# Patient Record
Sex: FEMALE | Race: WHITE | NOT HISPANIC OR LATINO | Employment: FULL TIME | URBAN - METROPOLITAN AREA
[De-identification: names, ages, dates, MRNs, and addresses within clinical notes are randomized per-mention and may not be internally consistent; named-entity substitution may affect disease eponyms.]

---

## 2024-10-17 ENCOUNTER — HOSPITAL ENCOUNTER (OUTPATIENT)
Dept: SLEEP MEDICINE | Facility: HOSPITAL | Age: 39
End: 2024-10-17
Payer: COMMERCIAL

## 2024-10-17 VITALS — WEIGHT: 184.08 LBS | BODY MASS INDEX: 29.58 KG/M2 | HEIGHT: 66 IN

## 2024-10-17 DIAGNOSIS — R29.818 SUSPECTED SLEEP APNEA: ICD-10-CM

## 2024-10-17 DIAGNOSIS — R06.83 SNORING: ICD-10-CM

## 2024-10-17 DIAGNOSIS — G47.19 EXCESSIVE DAYTIME SLEEPINESS: ICD-10-CM

## 2024-10-17 PROCEDURE — 95800 SLP STDY UNATTENDED: CPT

## 2024-10-18 DIAGNOSIS — G47.33 OSA (OBSTRUCTIVE SLEEP APNEA): Primary | ICD-10-CM

## 2024-10-18 DIAGNOSIS — R06.83 SNORING: ICD-10-CM

## 2024-10-28 ENCOUNTER — TELEMEDICINE (OUTPATIENT)
Dept: SLEEP MEDICINE | Facility: CLINIC | Age: 39
End: 2024-10-28
Payer: COMMERCIAL

## 2024-10-28 VITALS — WEIGHT: 183 LBS | BODY MASS INDEX: 29.41 KG/M2 | HEIGHT: 66 IN

## 2024-10-28 DIAGNOSIS — G47.33 OSA (OBSTRUCTIVE SLEEP APNEA): Primary | ICD-10-CM

## 2024-10-28 PROCEDURE — 99213 OFFICE O/P EST LOW 20 MIN: CPT | Performed by: NURSE PRACTITIONER

## 2024-10-28 NOTE — PROGRESS NOTES
Chief Complaint:   Chief Complaint   Patient presents with    Follow-up       HPI:    Ronny Wen is a 39 y.o. female here for follow-up of sleep study.    Ronny Downing is a 39 y.o. female who was here to establish care 8/27/2024..  Patient does see Khadijah ANGLIN for care as well as Dr. Latoya Benavides.  Patient has a history of Hashimoto's thyroiditis, and suspected sleep apnea.  She does have a history of controlled anxiety.  Patient has a 1 to 2-year history of snoring, excessive daytime sleepiness, nonrestorative sleep, and frequent nighttime urination.  Patient states she has been  for 8 years but now has a partner who is complaining of the symptoms.  Patient has never had a nasal fracture, head injury, or hypnagogic hallucinations she has had a very rare episode of sleep paralysis.     Patient goes to bed during the week at 10 PM getting up at 6:30 AM.  On the weekend going to bed at 11 PM getting up at 8 AM.  She estimates getting 8 hours of sleep nightly.  He will usually take her 30 minutes to go to sleep and is not rested upon awakening.  Patient puts  [place] score at 7/24.  Patient does toss and turn all night and gets up x 1 for the bathroom.  Patient does like to take a daily nap anywhere from 30 minutes to 2 hours.  Patient is then still not rested.    Patient had sleep study 10/17/2024 that showed mild obstructive sleep apnea with an AHI of 7.0.  We did discuss this today as well as different options available to her for treatment and she does wish to initiate CPAP therapy.    Current medications are:   Current Outpatient Medications:     escitalopram (LEXAPRO) 10 MG tablet, Take 1 tablet by mouth Daily., Disp: , Rfl:     levothyroxine (Synthroid) 100 MCG tablet, Take 1 tablet by mouth Daily., Disp: 90 tablet, Rfl: 1.      The patient's relevant past medical, surgical, family and social history were reviewed and updated in Epic as appropriate.       Review of Systems   Constitutional:   Positive for fatigue.   HENT:  Positive for congestion, rhinorrhea, sinus pressure and sinus pain.    Eyes:  Positive for visual disturbance.   Respiratory:  Positive for apnea.    Allergic/Immunologic: Positive for environmental allergies.   Psychiatric/Behavioral:  Positive for sleep disturbance. The patient is nervous/anxious.    All other systems reviewed and are negative.        Objective:    Physical Exam  Constitutional:       Appearance: Normal appearance.   HENT:      Head: Normocephalic and atraumatic.   Pulmonary:      Effort: Pulmonary effort is normal. No respiratory distress.   Neurological:      Mental Status: She is alert and oriented to person, place, and time.   Psychiatric:         Mood and Affect: Mood normal.         Behavior: Behavior normal.         Thought Content: Thought content normal.         Judgment: Judgment normal.             ASSESSMENT/PLAN    Diagnoses and all orders for this visit:    1. CIARA (obstructive sleep apnea) (Primary)        Counseled patient regarding multimodal approach with healthy nutrition, healthy sleep, regular physical activity, social activities, counseling, and medications. Encouraged to practice lateral sleep position. Avoid alcohol and sedatives close to bedtime.    Order sent to [facility] in [place] to initiate CPAP therapy I will see her back in 31 to 90 days.  The patient is located in [facility] at work. The patient presents today for telehealth service.  This service was conducted via audio/video technology through a secure [employer] video visit connection through Epic.  This provider is located in [facility].  Patient stated they are in a secure environment for the session.  Patient's condition being diagnosed/treated is appropriate for telemedicine.  The provider identified himself as well as his credentials.  The patient, and/or patient's guardian, consent to be seen remotely, and when consent is given they understanding that the  consent allows for patient identifiable information to be sent to a third-party as needed.  They may refuse to be seen remotely at any time.  The electronic data is encrypted and password protected, and the patient and/or guardian has been advised of the potential risk to privacy not withstanding such measures.  Patient identifiers used: Name and date of birth.   I have reviewed the results of my evaluation and impression and discussed my recommendations in detail with the patient.      Signed by  DERREK Stoner    October 28, 2024      CC: Khadijah Aparicio APRN         No ref. provider found

## 2025-02-06 DIAGNOSIS — E06.3 HYPOTHYROIDISM DUE TO HASHIMOTO'S THYROIDITIS: ICD-10-CM

## 2025-02-06 RX ORDER — LEVOTHYROXINE SODIUM 100 UG/1
100 TABLET ORAL DAILY
Qty: 90 TABLET | Refills: 1 | Status: SHIPPED | OUTPATIENT
Start: 2025-02-06 | End: 2026-02-06

## 2025-05-28 ENCOUNTER — OFFICE VISIT (OUTPATIENT)
Dept: ENDOCRINOLOGY | Facility: CLINIC | Age: 40
End: 2025-05-28
Payer: OTHER GOVERNMENT

## 2025-05-28 VITALS
DIASTOLIC BLOOD PRESSURE: 76 MMHG | BODY MASS INDEX: 30.15 KG/M2 | SYSTOLIC BLOOD PRESSURE: 122 MMHG | HEIGHT: 66 IN | WEIGHT: 187.6 LBS | HEART RATE: 85 BPM | OXYGEN SATURATION: 97 %

## 2025-05-28 DIAGNOSIS — E06.3 HYPOTHYROIDISM DUE TO HASHIMOTO'S THYROIDITIS: Primary | ICD-10-CM

## 2025-05-28 DIAGNOSIS — G47.33 OSA (OBSTRUCTIVE SLEEP APNEA): ICD-10-CM

## 2025-05-28 LAB
T4 FREE SERPL-MCNC: 1.32 NG/DL (ref 0.92–1.68)
TSH SERPL DL<=0.05 MIU/L-ACNC: 0.75 UIU/ML (ref 0.27–4.2)

## 2025-05-28 PROCEDURE — 84439 ASSAY OF FREE THYROXINE: CPT | Performed by: INTERNAL MEDICINE

## 2025-05-28 PROCEDURE — 84443 ASSAY THYROID STIM HORMONE: CPT | Performed by: INTERNAL MEDICINE

## 2025-05-28 PROCEDURE — 99213 OFFICE O/P EST LOW 20 MIN: CPT | Performed by: INTERNAL MEDICINE

## 2025-05-28 PROCEDURE — 36415 COLL VENOUS BLD VENIPUNCTURE: CPT | Performed by: INTERNAL MEDICINE

## 2025-05-28 RX ORDER — DOXYCYCLINE 100 MG/1
100 CAPSULE ORAL DAILY
COMMUNITY
Start: 2025-05-20

## 2025-05-28 RX ORDER — MELOXICAM 15 MG/1
15 TABLET ORAL DAILY PRN
COMMUNITY
Start: 2025-03-10

## 2025-05-28 NOTE — ASSESSMENT & PLAN NOTE
Subclinical hypothyroidism diagnosed June 2023.  TSH became suppressed for short time and dose was reduced, but up to 100 mcg as of 4/2024 and recent TFTs normal.    With persistent fatigue but was diagnosed with CIARA and wasn't able to use CPAP due to settings.   Recheck TFTs today and titrate dose if needed. Consider addition of T3 if needed.

## 2025-05-28 NOTE — PROGRESS NOTES
"Chief Complaint   Patient presents with    Hypothyroidism    Hashimoto's Thyroiditis        HPI   Ronny Wen is a 40 y.o. female had concerns including Hypothyroidism and Hashimoto's Thyroiditis.      For follow-up on hypothyroidism.  Last dose increase in levothyroxine was about a year ago.  She is on levothyroxine 100 mcg daily.  Recent TFTs from February were stable/optimal range.  TSH 1.85.    She has been diagnosed with sleep apnea since her last visit here.  Was advised to start PAP therapy but didn't tolerate. Felt like the settings weren't correct. Was advised this couldn't be adjusted without the provider changing.         The following portions of the patient's history were reviewed and updated as appropriate: allergies, current medications, past family history, past medical history, past social history, past surgical history, and problem list.    Review of Systems   Constitutional:  Positive for fatigue.   Hematological:  Positive for adenopathy.        /76 (BP Location: Right arm, Patient Position: Sitting)   Pulse 85   Ht 167.6 cm (65.98\")   Wt 85.1 kg (187 lb 9.6 oz)   SpO2 97%   BMI 30.29 kg/m²      Physical Exam  Vitals reviewed.   Constitutional:       Appearance: Normal appearance.   Cardiovascular:      Rate and Rhythm: Normal rate.   Pulmonary:      Effort: Pulmonary effort is normal.   Neurological:      General: No focal deficit present.      Mental Status: She is alert. Mental status is at baseline.   Psychiatric:         Mood and Affect: Mood normal.         Behavior: Behavior normal.              LABS AND IMAGING       TSH  Lab Results   Component Value Date    TSH 0.075 (L) 12/27/2023       T4  Lab Results   Component Value Date    FREET4 1.44 12/27/2023 2/9/2024 TSH 6.06, free T4 0.99 On levothyroxine 75 mcg daily.  Increase to 88 mcg.    4/26/24 TSH 3.53, free T4 1.23, increased to 100 mcg  6/25/24 TSH 2.29, free T4 1.6 - stay on 100 mcg  2/4/2025 Free T4 1.34, TSH 1.85, " continue 100 mcg    Assessment and Plan    Diagnoses and all orders for this visit:    1. Hypothyroidism due to Hashimoto's thyroiditis (Primary)  Assessment & Plan:  Subclinical hypothyroidism diagnosed June 2023.  TSH became suppressed for short time and dose was reduced, but up to 100 mcg as of 4/2024 and recent TFTs normal.    With persistent fatigue but was diagnosed with CIARA and wasn't able to use CPAP due to settings.   Recheck TFTs today and titrate dose if needed. Consider addition of T3 if needed.    Orders:  -     T4, Free  -     TSH    2. CIARA (obstructive sleep apnea)  Assessment & Plan:  Diagnosed recently but didn't tolerate PAP. I encouraged her to follow-up to adjust settings.            Return in about 6 months (around 11/28/2025) for next scheduled follow up. The patient was instructed to contact the clinic with any interval questions or concerns.    Electronically signed by: Latoya Victor DO   Endocrinologist    Please note that portions of this note were completed with a voice recognition program.

## 2025-06-05 ENCOUNTER — OFFICE VISIT (OUTPATIENT)
Dept: SLEEP MEDICINE | Facility: CLINIC | Age: 40
End: 2025-06-05
Payer: OTHER GOVERNMENT

## 2025-06-05 VITALS
HEART RATE: 79 BPM | HEIGHT: 66 IN | TEMPERATURE: 97.8 F | SYSTOLIC BLOOD PRESSURE: 100 MMHG | WEIGHT: 182 LBS | OXYGEN SATURATION: 99 % | DIASTOLIC BLOOD PRESSURE: 74 MMHG | BODY MASS INDEX: 29.25 KG/M2

## 2025-06-05 DIAGNOSIS — G47.33 OSA (OBSTRUCTIVE SLEEP APNEA): Primary | ICD-10-CM

## 2025-06-05 PROCEDURE — 99213 OFFICE O/P EST LOW 20 MIN: CPT | Performed by: NURSE PRACTITIONER

## 2025-06-05 NOTE — PROGRESS NOTES
Chief Complaint:   Chief Complaint   Patient presents with    Follow-up    Sleeping Problem       HPI:    Ronny Wen is a 40 y.o. female here for follow-up of sleep apnea.  Patient was last seen 10/28/2024 and did wish to initiate CPAP therapy for an AHI of 7.0.  Patient states she has not been good about using CPAP.  She does continue to get 8 hours of sleep nightly.  And go to sleep within 20 to 30 minutes.  She does use the restroom 1-2 times in the night and has an [place] score of 7/24.  Patient has used her machine to the last 90 days.  She does have difficulty with pressure increasing during the night and causes her to be uncomfortable.  We will do an order today to decrease pressure 8-10 and increase ramp feature to 30 minutes if this is not comfortable and she cannot tolerate she would be a very good candidate for MAD.  Will follow-up in 6 months to reassess.        Current medications are:   Current Outpatient Medications:     doxycycline (VIBRAMYCIN) 100 MG capsule, Take 1 capsule by mouth Daily., Disp: , Rfl:     escitalopram (LEXAPRO) 10 MG tablet, Take 1 tablet by mouth Daily., Disp: , Rfl:     levothyroxine (Synthroid) 88 MCG tablet, Take 1 tablet by mouth Daily., Disp: 30 tablet, Rfl: 5    liothyronine (CYTOMEL) 5 MCG tablet, Take 1 tablet by mouth Daily., Disp: 30 tablet, Rfl: 5    meloxicam (MOBIC) 15 MG tablet, Take 1 tablet by mouth Daily As Needed (plantar facitis)., Disp: , Rfl:     metroNIDAZOLE-Cleanser 0.75 % CREAM kit, APPLY A THIN LAYER TO THE AFFECTED AREA(S) BY TOPICAL ROUTE ONCE PER DAY AT BEDTIME., Disp: , Rfl: .      The patient's relevant past medical, surgical, family and social history were reviewed and updated in Epic as appropriate.       Review of Systems   HENT:  Positive for congestion, rhinorrhea, sinus pressure and sinus pain.    Eyes:  Positive for visual disturbance.   Respiratory:  Positive for apnea.    Allergic/Immunologic: Positive for environmental allergies.  "  Psychiatric/Behavioral:  Positive for sleep disturbance. The patient is nervous/anxious.    All other systems reviewed and are negative.        Objective:    Physical Exam  Constitutional:       Appearance: Normal appearance.   HENT:      Head: Normocephalic and atraumatic.   Cardiovascular:      Rate and Rhythm: Normal rate and regular rhythm.   Pulmonary:      Effort: Pulmonary effort is normal.      Breath sounds: Normal breath sounds.   Skin:     General: Skin is warm and dry.   Neurological:      Mental Status: She is alert and oriented to person, place, and time.   Psychiatric:         Mood and Affect: Mood normal.         Behavior: Behavior normal.         Thought Content: Thought content normal.         Judgment: Judgment normal.     /74   Pulse 79   Temp 97.8 °F (36.6 °C)   Ht 167.6 cm (65.98\")   Wt 82.6 kg (182 lb)   SpO2 99%   BMI 29.39 kg/m²       CPAP Report  2/90 days of use  Greater than 4-hour use 0  Setting 818  95th percentile pressure 9.1  AHI 1.8    The patient continues to use and benefit from CPAP therapy.    ASSESSMENT/PLAN    Diagnoses and all orders for this visit:    1. CIARA (obstructive sleep apnea) (Primary)  -     PAP Therapy        Counseled patient regarding multimodal approach with healthy nutrition, healthy sleep, regular physical activity, social activities, counseling, and medications. Encouraged to practice lateral sleep position. Avoid alcohol and sedatives close to bedtime.    Order for setting change 8-10 with 30-minute ramp time at 4 cm H2O I will see her back at 6 weeks to reassess and at that time we can discuss MAD if she is still uncomfortable.      Signed by  DERREK Stoner    June 5, 2025      CC: RecordKhadijah APRN         No ref. provider found      "

## 2025-06-18 ENCOUNTER — TELEPHONE (OUTPATIENT)
Dept: SLEEP MEDICINE | Facility: CLINIC | Age: 40
End: 2025-06-18

## 2025-06-18 NOTE — TELEPHONE ENCOUNTER
"Caller: Ronny Wen \"Mathew\"    Relationship to patient: Self    Best call back number: [phone]     Patient is needing: PT CALLING TO CHECK STATUS OF REQUEST FOR ORDERS TO BE PLACED REGARDING PUMP ADJUSTMENT. PLEASE CALL TO ADVISE.    "

## 2025-06-19 NOTE — TELEPHONE ENCOUNTER
"    Hub staff attempted to follow warm transfer process and was unsuccessful     Caller: Ronny Wen \"Mathew\"    Relationship to patient: Self    Best call back number: [phone]     Patient is needing: PT STATES THEY HAVENT RECEIVED. SHOULD BE SENT TO [facility] IN [place] EMAIL TO [email or web address]   FX AND EMAIL TO  AS WELL [phone]    "

## 2025-07-23 ENCOUNTER — OFFICE VISIT (OUTPATIENT)
Dept: SLEEP MEDICINE | Facility: CLINIC | Age: 40
End: 2025-07-23
Payer: OTHER GOVERNMENT

## 2025-07-23 VITALS
SYSTOLIC BLOOD PRESSURE: 112 MMHG | HEART RATE: 85 BPM | HEIGHT: 66 IN | BODY MASS INDEX: 28.28 KG/M2 | TEMPERATURE: 98 F | WEIGHT: 176 LBS | DIASTOLIC BLOOD PRESSURE: 72 MMHG | OXYGEN SATURATION: 98 %

## 2025-07-23 DIAGNOSIS — G47.33 OSA (OBSTRUCTIVE SLEEP APNEA): Primary | ICD-10-CM

## 2025-07-23 PROCEDURE — 99213 OFFICE O/P EST LOW 20 MIN: CPT | Performed by: NURSE PRACTITIONER

## 2025-07-23 NOTE — PROGRESS NOTES
Chief Complaint:   Chief Complaint   Patient presents with    Follow-up    Sleep Apnea       HPI:    Ronny Wen is a 40 y.o. female here for follow-up of sleep apnea.  Patient was last seen 6/5/2025 we did do an order at that time to decrease her pressure 8-10 and increase ramp to 30 minutes and apparently this has not been done by her DME.  This order has been faxed to them and documented x 3 and patient also went into the office x 2 and they told her both times they would take care of it.  It is still not been done.  She also feels she is having a large leak in her current mask and wishes to have a change in size of the mass.  She is called and left several messages without a return phone call.  Patient is eager to be compliant and eager to do well.  I will make a phone call to her DME today.        Current medications are:   Current Outpatient Medications:     doxycycline (VIBRAMYCIN) 100 MG capsule, Take 1 capsule by mouth Daily., Disp: , Rfl:     escitalopram (LEXAPRO) 10 MG tablet, Take 1 tablet by mouth Daily., Disp: , Rfl:     levothyroxine (Synthroid) 88 MCG tablet, Take 1 tablet by mouth Daily., Disp: 30 tablet, Rfl: 5    liothyronine (CYTOMEL) 5 MCG tablet, Take 1 tablet by mouth Daily., Disp: 30 tablet, Rfl: 5    meloxicam (MOBIC) 15 MG tablet, Take 1 tablet by mouth Daily As Needed (plantar facitis)., Disp: , Rfl:     metroNIDAZOLE-Cleanser 0.75 % CREAM kit, APPLY A THIN LAYER TO THE AFFECTED AREA(S) BY TOPICAL ROUTE ONCE PER DAY AT BEDTIME., Disp: , Rfl: .      The patient's relevant past medical, surgical, family and social history were reviewed and updated in Epic as appropriate.       Review of Systems   HENT:  Positive for congestion, rhinorrhea, sinus pressure and sinus pain.    Eyes:  Positive for visual disturbance.   Respiratory:  Positive for apnea.    Allergic/Immunologic: Positive for environmental allergies.   Psychiatric/Behavioral:  Positive for sleep disturbance. The patient is  "nervous/anxious.    All other systems reviewed and are negative.        Objective:    Physical Exam  Constitutional:       Appearance: Normal appearance.   HENT:      Head: Normocephalic and atraumatic.      Mouth/Throat:      Comments: Mallampati 1 anatomy  Pulmonary:      Effort: Pulmonary effort is normal. No respiratory distress.   Skin:     General: Skin is warm and dry.   Neurological:      Mental Status: She is alert and oriented to person, place, and time.   Psychiatric:         Mood and Affect: Mood normal.         Behavior: Behavior normal.         Thought Content: Thought content normal.         Judgment: Judgment normal.       /72   Pulse 85   Temp 98 °F (36.7 °C)   Ht 167.6 cm (65.98\")   Wt 79.8 kg (176 lb)   SpO2 98%   BMI 28.42 kg/m²     CPAP Report    9/30 days of use  Greater than 4-hour use 10%  Setting 8-18  95th percentile pressure 9.8  AHI of 2.0  The patient continues to use and benefit from CPAP therapy.    ASSESSMENT/PLAN    Diagnoses and all orders for this visit:    1. CIARA (obstructive sleep apnea) (Primary)        Counseled patient regarding multimodal approach with healthy nutrition, healthy sleep, regular physical activity, social activities, counseling, and medications. Encouraged to practice lateral sleep position. Avoid alcohol and sedatives close to bedtime.      We are going to call and refaxed the order for pressure change again today and see her back in 4 weeks.  Signed by  DERREK Stoner    July 23, 2025      CC: Record, DERREK Lucio         No ref. provider found      "

## 2025-08-28 ENCOUNTER — OFFICE VISIT (OUTPATIENT)
Dept: SLEEP MEDICINE | Facility: CLINIC | Age: 40
End: 2025-08-28
Payer: OTHER GOVERNMENT

## 2025-08-28 VITALS
BODY MASS INDEX: 29.57 KG/M2 | HEART RATE: 96 BPM | WEIGHT: 184 LBS | OXYGEN SATURATION: 97 % | HEIGHT: 66 IN | TEMPERATURE: 97.5 F | DIASTOLIC BLOOD PRESSURE: 70 MMHG | SYSTOLIC BLOOD PRESSURE: 102 MMHG

## 2025-08-28 DIAGNOSIS — G47.33 OSA (OBSTRUCTIVE SLEEP APNEA): Primary | ICD-10-CM

## 2025-08-28 PROCEDURE — 99213 OFFICE O/P EST LOW 20 MIN: CPT | Performed by: NURSE PRACTITIONER
